# Patient Record
Sex: MALE | Race: WHITE | Employment: FULL TIME | URBAN - METROPOLITAN AREA
[De-identification: names, ages, dates, MRNs, and addresses within clinical notes are randomized per-mention and may not be internally consistent; named-entity substitution may affect disease eponyms.]

---

## 2017-04-19 PROBLEM — H93.292 ABNORMAL AUDITORY PERCEPTION, LEFT: Status: ACTIVE | Noted: 2017-04-19

## 2017-04-19 PROBLEM — H93.12 LEFT-SIDED TINNITUS: Status: ACTIVE | Noted: 2017-04-19

## 2022-12-08 ENCOUNTER — HOSPITAL ENCOUNTER (OUTPATIENT)
Dept: CT IMAGING | Age: 62
Discharge: HOME | End: 2022-12-08
Attending: INTERNAL MEDICINE

## 2022-12-08 VITALS — WEIGHT: 200 LBS | HEIGHT: 72 IN | BODY MASS INDEX: 27.09 KG/M2

## 2022-12-08 DIAGNOSIS — Z13.6 SCREENING FOR CARDIOVASCULAR CONDITION: ICD-10-CM

## 2024-06-17 ENCOUNTER — TELEPHONE (OUTPATIENT)
Dept: OTHER | Facility: HOSPITAL | Age: 64
End: 2024-06-17

## 2024-06-17 ENCOUNTER — ORDER TRANSCRIPTION (OUTPATIENT)
Dept: ADMINISTRATIVE | Facility: HOSPITAL | Age: 64
End: 2024-06-17

## 2024-06-17 DIAGNOSIS — Z13.6 SCREENING FOR CARDIOVASCULAR CONDITION: Primary | ICD-10-CM

## 2024-06-17 NOTE — PROGRESS NOTES
Patient was transferred to our RN Line when he tried scheduling the Heart Scan.  He completed the Heart Scan 12/8/2022 and had a calcium score 875.  Patient is noted to have an enlarged Aorta.  Patient had just seen one of his physicians that discussed doing a chest CT annually.  Patient thought he was to schedule the Heart Scan annually.  I spoke with patient and explained the difference between the Heart Scan and the Chest CT scan.  Patient will talk with his doctors office again and clarify when he should have this chest ct scan done and if they have put an order in for this.  Patient voiced understanding.